# Patient Record
Sex: MALE | Race: WHITE
[De-identification: names, ages, dates, MRNs, and addresses within clinical notes are randomized per-mention and may not be internally consistent; named-entity substitution may affect disease eponyms.]

---

## 2020-06-10 ENCOUNTER — HOSPITAL ENCOUNTER (OUTPATIENT)
Dept: HOSPITAL 96 - M.INFUS | Age: 55
End: 2020-06-10
Attending: FAMILY MEDICINE
Payer: COMMERCIAL

## 2020-06-10 VITALS — SYSTOLIC BLOOD PRESSURE: 166 MMHG | DIASTOLIC BLOOD PRESSURE: 96 MMHG

## 2020-06-10 VITALS — SYSTOLIC BLOOD PRESSURE: 130 MMHG | DIASTOLIC BLOOD PRESSURE: 81 MMHG

## 2020-06-10 DIAGNOSIS — D75.1: Primary | ICD-10-CM

## 2020-06-10 LAB
HCT VFR BLD CALC: 53 % (ref 42–52)
HGB BLD-MCNC: 18.2 GM/DL (ref 14–18)

## 2020-06-10 NOTE — NUR
ARRIVED AMBULATORY. MADE SELF COMFORTABLE IN RECLINER. HISTORY AND ALLERGY
REVIEWED. TIME OUT COMPLETED. EDUCATION COMPLETED. PT DENIES QUESTIONS.
THERAPUTIC PHLEBOTOMY OF 500ML REMOVED VIA GRAVITY PER PROTOCOL. TOLERATED
WELL. H+H DRAWN PER ORDER. DENIES QUESTIONES OR NEEDS AT DISCHARGE.

## 2020-06-29 ENCOUNTER — HOSPITAL ENCOUNTER (OUTPATIENT)
Dept: HOSPITAL 96 - M.LAB | Age: 55
End: 2020-06-29
Attending: FAMILY MEDICINE
Payer: COMMERCIAL

## 2020-06-29 DIAGNOSIS — D45: Primary | ICD-10-CM

## 2020-06-29 LAB
HCT VFR BLD CALC: 52.2 % (ref 42–52)
HGB BLD-MCNC: 18 GM/DL (ref 14–18)

## 2021-07-27 ENCOUNTER — HOSPITAL ENCOUNTER (INPATIENT)
Dept: HOSPITAL 96 - M.ERS | Age: 56
LOS: 8 days | Discharge: HOME HEALTH | DRG: 74 | End: 2021-08-04
Attending: INTERNAL MEDICINE | Admitting: INTERNAL MEDICINE
Payer: COMMERCIAL

## 2021-07-27 VITALS — DIASTOLIC BLOOD PRESSURE: 88 MMHG | SYSTOLIC BLOOD PRESSURE: 152 MMHG

## 2021-07-27 VITALS — SYSTOLIC BLOOD PRESSURE: 129 MMHG | DIASTOLIC BLOOD PRESSURE: 73 MMHG

## 2021-07-27 VITALS — BODY MASS INDEX: 40.43 KG/M2 | WEIGHT: 315 LBS | HEIGHT: 74.02 IN

## 2021-07-27 VITALS — SYSTOLIC BLOOD PRESSURE: 140 MMHG | DIASTOLIC BLOOD PRESSURE: 78 MMHG

## 2021-07-27 DIAGNOSIS — I50.32: ICD-10-CM

## 2021-07-27 DIAGNOSIS — E11.40: ICD-10-CM

## 2021-07-27 DIAGNOSIS — Z20.822: ICD-10-CM

## 2021-07-27 DIAGNOSIS — Z79.84: ICD-10-CM

## 2021-07-27 DIAGNOSIS — E78.5: ICD-10-CM

## 2021-07-27 DIAGNOSIS — Z98.1: ICD-10-CM

## 2021-07-27 DIAGNOSIS — I11.0: ICD-10-CM

## 2021-07-27 DIAGNOSIS — K59.00: ICD-10-CM

## 2021-07-27 DIAGNOSIS — G57.01: Primary | ICD-10-CM

## 2021-07-27 DIAGNOSIS — Z79.899: ICD-10-CM

## 2021-07-27 DIAGNOSIS — G89.29: ICD-10-CM

## 2021-07-27 DIAGNOSIS — E11.42: ICD-10-CM

## 2021-07-27 LAB
ABSOLUTE BASOPHILS: 0.1 THOU/UL (ref 0–0.2)
ABSOLUTE EOSINOPHILS: 0.2 THOU/UL (ref 0–0.7)
ABSOLUTE MONOCYTES: 0.8 THOU/UL (ref 0–1.2)
ALBUMIN SERPL-MCNC: 3.3 G/DL (ref 3.4–5)
ALP SERPL-CCNC: 48 U/L (ref 46–116)
ALT SERPL-CCNC: 51 U/L (ref 30–65)
ANION GAP SERPL CALC-SCNC: 10 MMOL/L (ref 7–16)
AST SERPL-CCNC: 23 U/L (ref 15–37)
BASOPHILS NFR BLD AUTO: 0.9 %
BILIRUB SERPL-MCNC: 0.2 MG/DL
BUN SERPL-MCNC: 17 MG/DL (ref 7–18)
CALCIUM SERPL-MCNC: 8.8 MG/DL (ref 8.5–10.1)
CHLORIDE SERPL-SCNC: 106 MMOL/L (ref 98–107)
CO2 SERPL-SCNC: 28 MMOL/L (ref 21–32)
CREAT SERPL-MCNC: 1.2 MG/DL (ref 0.6–1.3)
EOSINOPHIL NFR BLD: 2.2 %
GLUCOSE SERPL-MCNC: 139 MG/DL (ref 70–99)
GRANULOCYTES NFR BLD MANUAL: 68.7 %
HCT VFR BLD CALC: 41.8 % (ref 42–52)
HGB BLD-MCNC: 14.4 GM/DL (ref 14–18)
LYMPHOCYTES # BLD: 2.2 THOU/UL (ref 0.8–5.3)
LYMPHOCYTES NFR BLD AUTO: 20.7 %
MCH RBC QN AUTO: 32.7 PG (ref 26–34)
MCHC RBC AUTO-ENTMCNC: 34.4 G/DL (ref 28–37)
MCV RBC: 95.1 FL (ref 80–100)
MONOCYTES NFR BLD: 7.5 %
MPV: 8.2 FL. (ref 7.2–11.1)
NEUTROPHILS # BLD: 7.2 THOU/UL (ref 1.6–8.1)
NUCLEATED RBCS: 0 /100WBC
PLATELET COUNT*: 213 THOU/UL (ref 150–400)
POTASSIUM SERPL-SCNC: 3.9 MMOL/L (ref 3.5–5.1)
PROT SERPL-MCNC: 6.4 G/DL (ref 6.4–8.2)
RBC # BLD AUTO: 4.39 MIL/UL (ref 4.5–6)
RDW-CV: 14.8 % (ref 10.5–14.5)
SODIUM SERPL-SCNC: 144 MMOL/L (ref 136–145)
WBC # BLD AUTO: 10.5 THOU/UL (ref 4–11)

## 2021-07-28 VITALS — SYSTOLIC BLOOD PRESSURE: 132 MMHG | DIASTOLIC BLOOD PRESSURE: 76 MMHG

## 2021-07-28 VITALS — SYSTOLIC BLOOD PRESSURE: 110 MMHG | DIASTOLIC BLOOD PRESSURE: 64 MMHG

## 2021-07-28 VITALS — DIASTOLIC BLOOD PRESSURE: 89 MMHG | SYSTOLIC BLOOD PRESSURE: 157 MMHG

## 2021-07-28 VITALS — DIASTOLIC BLOOD PRESSURE: 67 MMHG | SYSTOLIC BLOOD PRESSURE: 129 MMHG

## 2021-07-28 VITALS — SYSTOLIC BLOOD PRESSURE: 109 MMHG | DIASTOLIC BLOOD PRESSURE: 79 MMHG

## 2021-07-28 VITALS — SYSTOLIC BLOOD PRESSURE: 125 MMHG | DIASTOLIC BLOOD PRESSURE: 52 MMHG

## 2021-07-28 PROCEDURE — 5A09357 ASSISTANCE WITH RESPIRATORY VENTILATION, LESS THAN 24 CONSECUTIVE HOURS, CONTINUOUS POSITIVE AIRWAY PRESSURE: ICD-10-PCS | Performed by: INTERNAL MEDICINE

## 2021-07-28 NOTE — NUR
Nutrition: Pt admitted with back pain. Consult came through for "carb
control." Diet ordered was Regular, I restricted it to carb control. H/o DM,
HTN, neuropathy. Med: statin. Labs: , albumin 3.3. Pt was asleep at time
of visit. No other nutrition concerns at this time. Low risk.

## 2021-07-28 NOTE — NUR
cm s/w pt who indicated he has a long withstanding hx with chronic back px. pt
has had 3 back sx within 20 years. pt indicated he doesnt recall his back
hurting this bad, it has been debilitating, as evidenced by pt being in bed
for "the last four days." pt believes he injured his back moving lawn
furniture. pt has hx with outpt therapy. pt want hh as he indicated he has a
home gym with the exercise equip he needs.  pt lives with wife and kids,
usually active, "riding juju and fishing." pt has walker, cane, and cpap.
poc is for neuro consult and ortho consult. cm to cont to follow.

## 2021-07-28 NOTE — NUR
PT REMAINED ALERT AND ORIENTED. PT C/O PAIN, MEDS GIVEN AS ORDERED. FALL RISK
PRECAUTIONS IN PLACE. HOURLY ROUNDING COMPLETED. CALL LIGHT WITHIN REACH.

## 2021-07-29 VITALS — DIASTOLIC BLOOD PRESSURE: 94 MMHG | SYSTOLIC BLOOD PRESSURE: 153 MMHG

## 2021-07-29 VITALS — SYSTOLIC BLOOD PRESSURE: 134 MMHG | DIASTOLIC BLOOD PRESSURE: 82 MMHG

## 2021-07-29 VITALS — SYSTOLIC BLOOD PRESSURE: 111 MMHG | DIASTOLIC BLOOD PRESSURE: 55 MMHG

## 2021-07-29 NOTE — NUR
PATIENT HAS REMAINED A&OX4, PLEASANT AND COOPERATIVE WITH CARES THIS SHIFT.
HAS REQUIRED FREQUENT PAIN MEDICATION FOR SEVERE BACK PAIN WITH ONLY PARTIAL
RESULTS. PATIENT REFUSED MRI THIS MORNING, EVEN AFTER BEING OFFERED ONE TIME
DOSE OF IV ATIVAN (DR. NATH AWARE). DR. CARMONA CONSULTED. BEDREST
MAINTAINED. CALL LIGHT AND FREQUENTLY USED ITEMS WITHIN REACH.

## 2021-07-30 VITALS — SYSTOLIC BLOOD PRESSURE: 133 MMHG | DIASTOLIC BLOOD PRESSURE: 81 MMHG

## 2021-07-30 VITALS — DIASTOLIC BLOOD PRESSURE: 80 MMHG | SYSTOLIC BLOOD PRESSURE: 133 MMHG

## 2021-07-30 VITALS — SYSTOLIC BLOOD PRESSURE: 130 MMHG | DIASTOLIC BLOOD PRESSURE: 76 MMHG

## 2021-07-30 NOTE — NUR
PATIENT RESTING IN BED.  ALERT AND ORIENTED X4.  C/O BACK PAIN. BACLOFEN AND
OXYCODONE X2 GIVEN.  ICE PACKS PLACED TO BACK.  ROOM AIR DURING DAY SHIFT,
CPAP DURING NIGHT.  BLOOD SUGARS MONITORED. NO INSULIN ORDERED.  IV TO LEFT
AC, SALINE LOCKED, PATENT.  BED IN LOW/LOCKED POSITION.  CALL LIGTH WITHIN
REACH.  ALL QUESTIONS AND CONCERNS ADDRESSED.

## 2021-07-30 NOTE — NUR
Surgery consulted, no surgical plan warranted at this time. Continue to
monitor pain control. If Pt wants HH at dc, fax referral to [facility] at
[phone]. Fax H&P, facesheet and dc orders.

## 2021-07-31 VITALS — SYSTOLIC BLOOD PRESSURE: 143 MMHG | DIASTOLIC BLOOD PRESSURE: 78 MMHG

## 2021-07-31 VITALS — SYSTOLIC BLOOD PRESSURE: 137 MMHG | DIASTOLIC BLOOD PRESSURE: 81 MMHG

## 2021-07-31 VITALS — DIASTOLIC BLOOD PRESSURE: 78 MMHG | SYSTOLIC BLOOD PRESSURE: 130 MMHG

## 2021-07-31 NOTE — NUR
PT A&O X 4. VSS ON RA. MEDS GIVEN AS ORDERED. PAIN MANAGED WITH OXYCODONE AND
MUSCLE RELAXERS. USED URINAL TO VOID. CALL LIGHT WITHIN REACH. WILL CONTINUE
TO MONITOR.

## 2021-07-31 NOTE — NUR
PT MOVING A LITTLE BIT BETTER THIS EVENING STILL COIULD NOT GET UP AND WALK.
VSS AFEBRILE. WILL CONTINUE TO MONITOR PLAN OF CARE.

## 2021-07-31 NOTE — CON
[address]  [address]                    CONSULTATION                  
_______________________________________________________________________________
 
Name:       ANA DE LA ROSA                 Room:           [address] [place]  
M.R.#:  [id number]      Account #:      [id number]  
Admission:  07/27/21     Attend Phys:    YU Cunha
Discharge:               Date of Birth:  [birth year]  
         Report #: [id number]
                                                                     [id number]
_______________________________________________________________________________
THIS REPORT FOR:  
 
cc:  Serge Melendez,Del Saha II, DO                                           ~
 
 
DATE OF CONSULTATION: 07/29/2021
 
ORTHOPEDIC CONSULTATION
 
CHIEF COMPLAINT:  Low back pain.
 
HISTORY OF PRESENT ILLNESS:  The patient is a 56-year-old male with medical 
history of CHF, diabetes, hypertension, neuropathy and previous back surgeries 
in 1993, 2015 and 2016.  Most recent by  ____, his partner Dr. Roni Marte 
also had performed back surgery.  The patient states he is seen these gentlemen 
recently as 3 weeks ago; however, approximately 3-4 days ago, he was doing yard 
work behind a trailer, when he was loading and felt sudden pinching sensation.  
He went inside and sat down for most of the evening and when he was feeling 
better, he tried to get up and had difficulty with walking.  Pain was sharp and 
stabbing lasted for most of the morning of the next day, right up to 8/10.  He 
called 911 and was trying Tylenol, ibuprofen and aspirin as well as tramadol and
was brought to the Emergency Room for further evaluation.  He was then admitted 
for last several days.  Did have an MRI ordered, but the patient was 
noncompliant with getting this test done and declined to continue due to his 
larger size and difficulty fitting into the magnet hole.  We are consulted for 
further evaluation.
 
PAST MEDICAL HISTORY:  CHF, diabetes mellitus, hypertension, neuropathy, 
previous back surgeries and low back pain.
 
ALLERGIES:  The patient denies any significant allergies.
 
PAST SURGICAL HISTORY:  Three back surgeries as above.
 
MEDICATIONS:  Listed in chart and H and P.
 
SOCIAL HISTORY:  The patient denies any illicit drugs.  Does need occasional use
of marijuana.  States he does not smoke tobacco, does chew tobacco and has a 
past alcohol use.
 
FAMILY HISTORY:  Noncontributory.
 
REVIEW OF SYSTEMS:
GENERAL:  The patient denies any fevers or chills.
 
 
 
[place] [address]                    CONSULTATION                  
_______________________________________________________________________________
 
Name:       ANA DE LA ROSA                 Room:           [address]#:  [id number]      Account #:      [id number]  
Admission:  07/27/21     Attend Phys:    YU Cunha
Discharge:               Date of Birth:  [birth year]  
         Report #: [id number]
                                                                     [id number]
_______________________________________________________________________________
 
 
RESPIRATORY:  Denies any cough, shortness of breath.
CARDIOVASCULAR:  The patient denies any chest pain.
GASTROINTESTINAL:  The patient denies any nausea or vomiting.
MUSCULOSKELETAL:  The patient does have back pain.
A 12-point system review of systems is performed and negative other than the 
pertinent positives in the above review of systems.
 
PHYSICAL EXAMINATION:
VITAL SIGNS:  Temperature 36.4, pulse 66, respirations 17, blood pressure 
152/88.
GENERAL APPEARANCE:  The patient is alert and oriented, in mild distress.  
Notable pain throughout the lower back while adjusting in bed.  The patient has 
poor hygiene.  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
doing.
HEENT:  Atraumatic.  Ears patent.  Nose:  Moist mucous membranes throughout the 
lips and tongue.
CARDIOVASCULAR:  Regular rate and rhythm.  No gallops or rubs.
LUNGS:  Clear bilaterally.  Normal air movement.
ABDOMEN:  Soft, nontender, no HSM noted.
SKIN:  Warm, dry.  Color normal for situation.
EXTREMITIES:  The patient does have pain throughout the lower back, but denies 
any weakness in the right or left lower extremities.  Sensation is intact 
throughout the right and left lower extremities other than what was prior 
decreased due to his previous surgeries.
MUSCULOSKELETAL:  The patient has right paravertebral tenderness to deep 
palpation at the L4-L5 region.  No gross deformity.  No lesions noted to the 
previous incisions.  No significant hematoma or swellings in this region.  The 
patient has negative straight leg raise, but does have pain with flexion forward
in a seated position when asked to sit up on the edge of the bed without 
assistance and at this time he is declining due to significant pain.
 
IMAGING:  The patient had a CT of abdomen and pelvis with impressions as no 
acute process with abdomen and pelvis, hepatic steatosis. The x-ray of the hip 
and pelvis revealed a single view with AP and frog leg lateral view of the hip 
on the right side shows hip joint spaces are well preserved.  Mild osteophytosis
of the right lateral femoral head. Sacrum, SI joints and pubic symphysis are 
normal with only mild degenerative changes.
 
ASSESSMENT:  Intractable low back pain.  Acute exacerbation of his chronic back 
pain, suspected piriformis syndrome, chronic diastolic heart failure, 
hypertension, hypersensitivity lung disease, diabetes type 2, peripheral 
neuropathy.
 
PLAN:  At this time, the patient is currently be [place] [address]                    CONSULTATION                  
_______________________________________________________________________________
 
Name:       ANA DEL A ROSA                 Room:           [address] [place]  
R.#:  [id number]      Account #:      [id number]  
Admission:  07/27/21     Attend Phys:    YU Cunha
Discharge:               Date of Birth:  [birth year]  
         Report #: [id number]
                                                                     [id number]
_______________________________________________________________________________
 
 
recommend adding in a steroid Dosepak or prednisone taper.  This was discussed 
with Dr. Polo in detail.  The patient also discussed possible treatment with 
muscle relaxers, which he states, have helped in the past.  Cyclobenzaprine is 
discussed as well as with Dr. Polo.  Home medications are to be continued as 
well.  At this time, no orthopedic intervention for a surgical evaluation as 
recommended.  The patient was recommended the MRI of the lumbar spine; however, 
due to his anxiety and his noncompliance, the patient has declined to proceed 
with this test.  It Is recommended that the patient be discharged once he is 
more stable to follow up with his neurosurgeon,  ____ as soon as possible in 
order to continue monitoring his chronic situations.  I would recommend 
continued pain control as necessary to allow the patient to discharge into his 
normal vehicle and get to his previous surgeon's location.
 
Thank you very much for this consultation.
 
 
 
 
 
 
 
 
 
 
 
 
 
 
 
 
 
 
 
 
 
 
 
 
 
 
 
 
<ELECTRONICALLY SIGNED>
                                        By:  Del Dos Santos II, DO     
07/31/21     0720
D: 07/29/21 1721_______________________________________
T: 07/29/21 [id number] Dos Santos II, DO        /nt

## 2021-08-01 VITALS — SYSTOLIC BLOOD PRESSURE: 134 MMHG | DIASTOLIC BLOOD PRESSURE: 85 MMHG

## 2021-08-01 VITALS — SYSTOLIC BLOOD PRESSURE: 116 MMHG | DIASTOLIC BLOOD PRESSURE: 73 MMHG

## 2021-08-01 NOTE — NUR
PT AO X4 LYING IN BED AT TIME OF ASSESSMENT. PT COMPLAINS OF PAIN IN BACK THAT
IS 4-6/10 FOR WHICH HE IS TAKING OXYCODONE,AND ROBAXIN. HE WAS STILL HAVING
SOME SPASM AND PAIN SO THIS PM WE ADDED ULTRAM. THIS SEEMS TO KEEP THE SPASM
DOWN A BIT. HE IS HOPEFUL TO SIT BEDSIDE TODAY AND BE ABLE TO GET OUT OF BED.
HE IS WORRIED ABOUT BEING CONSTIPATED WITH ALL THE NARCOTICS HE HAS BEEN
TAKING AND IS REQUESTING SOME MAG CITRATE TO HELP WITH THIS, LAST BM WAS
YESTERDAY BUT WAS HARD TO PASS. CALL LIGHT WITHIN REACH

## 2021-08-02 VITALS — SYSTOLIC BLOOD PRESSURE: 148 MMHG | DIASTOLIC BLOOD PRESSURE: 91 MMHG

## 2021-08-02 VITALS — DIASTOLIC BLOOD PRESSURE: 81 MMHG | SYSTOLIC BLOOD PRESSURE: 125 MMHG

## 2021-08-02 VITALS — SYSTOLIC BLOOD PRESSURE: 122 MMHG | DIASTOLIC BLOOD PRESSURE: 78 MMHG

## 2021-08-02 NOTE — NUR
PT A&0X4, HR REGULAR, ON ROOM AIR, PT CONTINUES TO HAVE BACK PAIN PRN PERCOCET
GIVEN. IV DISCONTINUED AND DOCTOR SAID IT WAS OK TO LEAVE OUT.

## 2021-08-02 NOTE — NUR
PT AO X4 STILL HAVING PAIN AND TIGHTNESS IN HIS BACK DESPITE PAIN MEDS AND
MUSCLE RELAXERS. HE DID HAVE A BM LAST PM WITH HELP OF MIRALAX. HE IS ANXIOUS
TO GET  HOME AND WANTS TO WORK WITH PT FOR MOBILITY AND STRENGTHENING. HOURLY
ROUNDING COMPLETED.

## 2021-08-03 VITALS — DIASTOLIC BLOOD PRESSURE: 88 MMHG | SYSTOLIC BLOOD PRESSURE: 136 MMHG

## 2021-08-03 VITALS — SYSTOLIC BLOOD PRESSURE: 130 MMHG | DIASTOLIC BLOOD PRESSURE: 87 MMHG

## 2021-08-03 VITALS — DIASTOLIC BLOOD PRESSURE: 74 MMHG | SYSTOLIC BLOOD PRESSURE: 137 MMHG

## 2021-08-03 VITALS — SYSTOLIC BLOOD PRESSURE: 142 MMHG | DIASTOLIC BLOOD PRESSURE: 82 MMHG

## 2021-08-03 NOTE — NUR
CM spoke with Pt, plan to dc home tomorrow with [facility]. Sister in room and
she will assist Pt post dc, along with his kids. Plan to start Gabapentin
today.

## 2021-08-03 NOTE — NUR
PATIENT SLEPT MOST OF THE NIGHT. PATIENT WAS GIVEN PAIN SCHEDULED AND PRN PAIN
MEDS AS NEEDED WITH SOME RELIEF. PATIENT COULD POSSIBLY DISCHARGE TODAY. WILL
CONTINUE TO MONITOR.

## 2021-08-03 NOTE — NUR
PT REMAINED ALERT AND ORIENTED. PT RESTING IN BED. PAIN MEDS GIVEN AS ORDERED.
FALL RISK PRECAUTIONS IN PLACE. HOURLY ROUNDING COMPLETED.

## 2021-08-04 VITALS — DIASTOLIC BLOOD PRESSURE: 91 MMHG | SYSTOLIC BLOOD PRESSURE: 168 MMHG

## 2021-08-04 VITALS — SYSTOLIC BLOOD PRESSURE: 168 MMHG | DIASTOLIC BLOOD PRESSURE: 91 MMHG

## 2021-08-04 NOTE — NUR
PT SLEPT WELL OVERNIGHT. RECEIVING PO PAIN MED WITH GOOD RESULT.NO IV ACCESS.
USING URINAL TO VOID. HOPEFUL FOR DISCHARGE HOME TODAY. ABLE TO USE CALL LITE
AND MAKE NEEDS KNOWN.

## 2021-08-04 NOTE — NUR
PT LEFT PER AMBULANCE, ON THE CART. PT HAS GOOD UNDERSTANDING OF DISCHARGE
INSTRUCTIONS. PT'S SISTER IN THE ROOM WHEN GIVEN INSTRUCTIONS AND SHE HAS A
GOOD UNDERSTANDING OF DISCHARGE MEDS. PT HAS A LEVEL 4 PAIN LEVEL ON
DISCGARGE. PT DISCHARGED HOME WITH ALL BELONGINGS.